# Patient Record
Sex: FEMALE | Race: WHITE
[De-identification: names, ages, dates, MRNs, and addresses within clinical notes are randomized per-mention and may not be internally consistent; named-entity substitution may affect disease eponyms.]

---

## 2018-03-27 ENCOUNTER — HOSPITAL ENCOUNTER (OUTPATIENT)
Dept: HOSPITAL 24 - LAB | Age: 58
End: 2018-03-27
Attending: PSYCHIATRY & NEUROLOGY
Payer: COMMERCIAL

## 2018-03-27 DIAGNOSIS — I10: ICD-10-CM

## 2018-03-27 DIAGNOSIS — R05: Primary | ICD-10-CM

## 2018-03-27 DIAGNOSIS — R53.83: ICD-10-CM

## 2018-03-27 DIAGNOSIS — Z79.890: ICD-10-CM

## 2018-03-27 LAB
ALBUMIN SERPL BCP-MCNC: 3.5 G/DL (ref 3.4–5)
ALP SERPL-CCNC: 47 UNITS/L (ref 46–116)
ALT SERPL W P-5'-P-CCNC: 18 UNITS/L (ref 12–78)
AST SERPL W P-5'-P-CCNC: 11 UNITS/L (ref 15–37)
BASOPHILS # BLD AUTO: 0.1 X10^3/UL (ref 0–0.1)
BASOPHILS NFR BLD AUTO: 1.6 % (ref 0.2–1)
BUN SERPL-MCNC: 13 MG/DL (ref 7–18)
CALCIUM ALBUM COR SERPL-SCNC: (no result) MG/DL (ref 8.5–10.1)
CALCIUM ALBUM COR SERPL-SCNC: (no result) MMOL/L (ref 136–145)
CALCIUM SERPL-MCNC: 8.6 MG/DL (ref 8.5–10.1)
CHLORIDE SERPL-SCNC: 103 MMOL/L (ref 98–107)
CHOLEST SERPL-MCNC: 272 MG/DL (ref 0–200)
CHOLEST/HDLC SERPL: 3.6 {RATIO} (ref 0–5)
CO2 SERPL-SCNC: 29.4 MMOL/L (ref 21–32)
CREAT SERPL-MCNC: 0.73 MG/DL (ref 0.55–1.02)
EGFR  BLACK RACES: > 60 (ref 60–?)
EOSINOPHIL # BLD AUTO: 0.1 X10^3/UL (ref 0–0.2)
EOSINOPHIL NFR BLD AUTO: 1.4 % (ref 0.9–2.9)
ERYTHROCYTE [DISTWIDTH] IN BLOOD BY AUTOMATED COUNT: 13.7 % (ref 11.6–16.5)
ERYTHROCYTE [SEDIMENTATION RATE] IN BLOOD: 23 MM/HOUR (ref 0–20)
HCT VFR BLD AUTO: 39.8 % (ref 36–47)
HDLC SERPL-MCNC: 75 MG/DL (ref 40–60)
HGB BLD-MCNC: 13.5 G/DL (ref 12–16)
LYMPHOCYTES # BLD AUTO: 1.8 X10^3/UL (ref 1.3–2.9)
LYMPHOCYTES NFR BLD AUTO: 20.1 % (ref 21–51)
MCH RBC QN AUTO: 29.9 PG (ref 27–34)
MCHC RBC AUTO-ENTMCNC: 34 G/DL (ref 33–35)
MCV RBC AUTO: 87.8 FL (ref 80–100)
MONOCYTES # BLD AUTO: 0.5 X10^3/UL (ref 0.3–0.8)
MONOCYTES NFR BLD AUTO: 5.2 % (ref 0–13)
NEUTROPHILS # BLD AUTO: 6.5 X10^3/UL (ref 2.2–4.8)
NEUTROPHILS NFR BLD AUTO: 71.7 % (ref 42–75)
PLATELET # BLD: 260 X10^3/UL (ref 150–450)
PMV BLD AUTO: 8.6 FL (ref 7.4–11)
PROT SERPL-MCNC: 7.8 G/DL (ref 6.4–8.2)
RBC # BLD AUTO: 4.53 X10^6/UL (ref 3.5–5.4)
SODIUM SERPL-SCNC: 140 MMOL/L (ref 136–145)
T4 SERPL-MCNC: 11.9 UG/DL (ref 4.7–13.3)
TRIGL SERPL-MCNC: 124 MG/DL (ref 0–150)
TSH SERPL DL<=0.05 MIU/L-ACNC: 1.29 UIU/ML (ref 0.36–3.74)
WBC NRBC COR # BLD AUTO: 9 X10^3/UL (ref 3.6–10)

## 2018-03-27 PROCEDURE — 80061 LIPID PANEL: CPT

## 2018-03-27 PROCEDURE — 80053 COMPREHEN METABOLIC PANEL: CPT

## 2018-03-27 PROCEDURE — 83001 ASSAY OF GONADOTROPIN (FSH): CPT

## 2018-03-27 PROCEDURE — 84443 ASSAY THYROID STIM HORMONE: CPT

## 2018-03-27 PROCEDURE — 82671 ASSAY OF ESTROGENS: CPT

## 2018-03-27 PROCEDURE — 36415 COLL VENOUS BLD VENIPUNCTURE: CPT

## 2018-03-27 PROCEDURE — 71046 X-RAY EXAM CHEST 2 VIEWS: CPT

## 2018-03-27 PROCEDURE — 84436 ASSAY OF TOTAL THYROXINE: CPT

## 2018-03-27 PROCEDURE — 85025 COMPLETE CBC W/AUTO DIFF WBC: CPT

## 2018-03-27 PROCEDURE — 82306 VITAMIN D 25 HYDROXY: CPT

## 2018-03-27 PROCEDURE — 85652 RBC SED RATE AUTOMATED: CPT

## 2018-03-27 NOTE — RAD
HISTORY:  Cough. Fatigue.



Study: Two-view chest.



Comparison: None.



Findings:



The trachea is midline.  The cardiac silhouette is within normal limits.  The lungs are clear without
 focal infiltrate or effusion.  The bony thorax is unremarkable.  



IMPRESSION: 

 

No acute cardiopulmonary disease.







Reported By:Electronically Signed by PERI TRENT MD at 3/27/2018 3:06:06 PM

## 2018-04-01 LAB — ESTROGENS TOTAL: 805 PG/ML
